# Patient Record
Sex: FEMALE | Race: WHITE | NOT HISPANIC OR LATINO | Employment: FULL TIME | URBAN - METROPOLITAN AREA
[De-identification: names, ages, dates, MRNs, and addresses within clinical notes are randomized per-mention and may not be internally consistent; named-entity substitution may affect disease eponyms.]

---

## 2021-07-27 ENCOUNTER — OFFICE VISIT (OUTPATIENT)
Dept: URGENT CARE | Facility: URGENT CARE | Age: 46
End: 2021-07-27
Payer: COMMERCIAL

## 2021-07-27 ENCOUNTER — ANCILLARY PROCEDURE (OUTPATIENT)
Dept: GENERAL RADIOLOGY | Facility: CLINIC | Age: 46
End: 2021-07-27
Attending: PHYSICIAN ASSISTANT
Payer: COMMERCIAL

## 2021-07-27 VITALS
TEMPERATURE: 97.3 F | OXYGEN SATURATION: 96 % | DIASTOLIC BLOOD PRESSURE: 71 MMHG | HEART RATE: 80 BPM | SYSTOLIC BLOOD PRESSURE: 123 MMHG | WEIGHT: 140.7 LBS

## 2021-07-27 DIAGNOSIS — R05.9 COUGH: ICD-10-CM

## 2021-07-27 DIAGNOSIS — J18.9 PNEUMONIA OF RIGHT MIDDLE LOBE DUE TO INFECTIOUS ORGANISM: Primary | ICD-10-CM

## 2021-07-27 PROCEDURE — 71046 X-RAY EXAM CHEST 2 VIEWS: CPT | Performed by: RADIOLOGY

## 2021-07-27 PROCEDURE — 96372 THER/PROPH/DIAG INJ SC/IM: CPT | Performed by: PHYSICIAN ASSISTANT

## 2021-07-27 PROCEDURE — 99204 OFFICE O/P NEW MOD 45 MIN: CPT | Mod: 25 | Performed by: PHYSICIAN ASSISTANT

## 2021-07-27 RX ORDER — AZITHROMYCIN 250 MG/1
TABLET, FILM COATED ORAL
Qty: 6 TABLET | Refills: 0 | Status: SHIPPED | OUTPATIENT
Start: 2021-07-27

## 2021-07-27 RX ORDER — ALBUTEROL SULFATE 90 UG/1
1-2 AEROSOL, METERED RESPIRATORY (INHALATION)
COMMUNITY
Start: 2021-04-19

## 2021-07-27 RX ORDER — ALPRAZOLAM 0.25 MG
0.25 TABLET ORAL
COMMUNITY
Start: 2021-04-19

## 2021-07-27 RX ORDER — CEFTRIAXONE SODIUM 1 G
1 VIAL (EA) INJECTION ONCE
Status: COMPLETED | OUTPATIENT
Start: 2021-07-27 | End: 2021-07-27

## 2021-07-27 RX ORDER — PREDNISONE 20 MG/1
TABLET ORAL
COMMUNITY
Start: 2021-07-21

## 2021-07-27 RX ADMIN — Medication 1 G: at 18:12

## 2021-07-27 NOTE — PATIENT INSTRUCTIONS
Rest, Hydrate for the next 48 hours    Follow up with Primary Care Provider in 2 days    Follow up right away with worsening of symptoms    Patient Education     Pneumonia (Adult)  Pneumonia is an infection deep in the lungs. It is in the small air sacs (alveoli). It may be caused by a virus, fungus, or bacteria. Pneumonia caused by bacteria is often treated with an antibiotic. Severe cases may need to be treated in the hospital. Milder cases can be treated at home. Pneumonia symptoms are a lot like flu symptoms. They include fever, cough (dry or with phlegm), headache, muscle weakness, and pain. These symptoms often get worse in the first 2 days. But they often start to get better in the first week of treatment.    Home care  Follow these guidelines when caring for yourself at home:    Get plenty of rest. Don t let yourself get overly tired when you go back to your activities. Participate in activities as directed by your healthcare provider.    Stop smoking. This is the most important step you can take to help treat pneumonia. If you need help stopping smoking, talk with your healthcare provider.    Stay away from smoke and other irritants. Stay away from secondhand smoke. Don t let anyone smoke in your home.    Prevent lung infections. Ask your healthcare provider about the flu and pneumonia vaccines. Take steps to prevent colds and other lung infections.    Practice correct handwashing. Wash your hands often with soap and water. Use hand  when you can t wash your hands. Stay away from crowds during cold and flu season.    Use pain medicine as directed. You may use acetaminophen or ibuprofen to control fever or pain, unless another medicine was prescribed. If you have chronic liver or kidney disease, talk with your healthcare provider before using these medicines. Also talk with your provider if you ve had a stomach ulcer or GI (gastrointestinal) bleeding. Don t give aspirin to a child younger than age  19 unless directed by the provider. Taking aspirin can put a child at risk for Reye syndrome. This is a rare but very serious disorder. It most often affects the brain and the liver.    Eat a light diet as needed. You may not feel like eating, so a light diet is fine. Follow the treatment plan as advised by your healthcare provider.    Drink plenty of water and fluids. This can make mucus thinner and easier to cough up. Ask your healthcare provider how much water you should drink. For many people, 6 to 8 glasses (8 ounces each) a day is a good goal. Other fluids include sport drinks, sodas without caffeine, juices, tea, or soup. If you also have heart or kidney disease, check with your provider before you drink extra fluids.    Finish all prescription medicine. Take antibiotic or antiviral medicine as prescribed by your healthcare provider, even if you are feeling better after a few days. Take the medicine until it is all gone.    Try to stay away from air pollution. If you live in an area with air pollution, track the Air Quality Index (AQI) reports and plan your outdoor activities with the AQI recommendations in mind  Follow-up care  Follow up with your healthcare provider in the next 2 to 3 days, or as advised. This is to be sure the medicine is helping you get better.  If you are 65 or older, you should get a pneumococcal vaccine and a yearly flu (influenza) shot. You should also get these vaccines if you have chronic lung disease such as asthma, emphysema, or COPD. A second type of pneumonia vaccine is also available for people over age 65 and those younger than 65 with certain health conditions. Talk with your healthcare provider about which pneumococcal vaccine is best for you.  Call 911  Call 911 if any of these occur:    Unable to speak or swallow    Lips or skin looks blue, purple, or gray    Feeling dizzy or faint    Unable to wake up or loss of consciousness    Feeling of doom    Trouble breathing or  wheezing    Shortness of breath gets worse or doesn't get better with treatment    Rapid breathing (more than 25 breaths per minute)    Coughing up blood    Chest pain gets worse with breathing or doesn't get better with treatment  When to get medical advice  Call your healthcare provider right away if any of these occur:    You don t get better in the first 2 days of treatment    Fever of 100.4 F (38 C) or higher, or as directed by your healthcare provider    Shaking chills    Cough with phlegm that doesn't get better, or get worse    Shortness of breath with activities    Weakness, dizziness, or fainting that gets worse    Thirst or dry mouth that gets worse    Sinus pain, headache, or a stiff neck    Chest pain with breathing or coughing    Symptoms that get worse or not improving  [employer] last reviewed this educational content on 11/1/2019 2000-2021 [employer]. All rights reserved. This information is not intended as a substitute for professional medical care. Always follow your healthcare professional's instructions.             Follow up immediately with severe headache, chest pain, or shortness of breath    Rest, isolate for 10 days, hydrate, follow up if worsening or new symptoms  Household members to isolate until test results, if positive isolate for 2 weeks and follow up for testing if symptoms occur         Patient Education     Coronavirus Disease 2019 (COVID-19): Caring for Yourself or Others   If you or a household member have symptoms of COVID-19, follow the guidelines below. This will help you manage symptoms and keep the virus from spreading.  If you have symptoms of COVID-19    Stay home and contact your care team. They will tell you what to do.    Don t panic. Keep in mind that other illnesses can cause similar symptoms.    Stay away from work, school, and public places.    Limit physical contact with others in your home. Limit visitors. No kissing.  Clean surfaces you touch  with disinfectant.  If you need to cough or sneeze, do it into a tissue. Then throw the tissue into the trash. If you don't have tissues, cough or sneeze into the bend of your elbow.  Don t share food or personal items with people in your household. This includes items like eating and drinking utensils, towels, and bedding.  Wear a cloth face mask around other people. During a public health emergency, medical face masks may be reserved for healthcare workers. You may need to make a cloth face mask of your own. You can do this using a bandana, T-shirt, or other cloth. The [facility] has instructions on how to make a face mask. Wear the mask so that it covers both your nose and mouth.  If you need to go to a hospital or clinic, call ahead to let them know. Expect the care team to wear masks, gowns, gloves, and eye protection. You may need to come to a different entrance or wait in a separate room.  Follow all instructions from your care team.    If you ve been diagnosed with COVID-19    Stay home and away from others, including other people in your home. (This is called self-isolation.) Don t leave home unless you need to get medical care. Don t go to work, school, or public places. Don t use buses, taxis, or other public transportation.    Follow all instructions from your care team.    If you need to go to a hospital or clinic, call ahead to let them know. Expect the care team to wear masks, gowns, gloves, and eye protection. You may need to come to a different entrance or wait in a separate room.    Wear a face mask over your nose and mouth. This is to protect others from your germs. If you can t wear a mask, your caregivers should wear one. You may need to make your own mask using a bandana, T-shirt, or other cloth. See the [facility] s instructions on how to make a face mask.    Have no contact with pets and other animals.    Don t share food or personal items with people in your household. This includes items like eating and  drinking utensils, towels, and bedding.    If you need to cough or sneeze, do it into a tissue. Then throw the tissue into the trash. If you don't have tissues, cough or sneeze into the bend of your elbow.    Wash your hands often.    Self-care at home   At this time, there is no medicine approved to prevent or treat COVID-19. The FDA has approved an antiviral medicine (called remdesivir) for people being treated in the hospital. This is for people 12 years and older who weigh more than about 88 pounds (40 kgs). In certain cases, it may also be used for people younger than 12 years or who weigh less than about 88 pounds (40 kgs)..  Currently, treatment is mostly aimed at helping your body while it fights the virus.    Getting extra rest.    Drink extra fluids 6 to 8 glasses of liquids each day), unless a doctor has told you not to. Ask your care team which fluids are best for you. Avoid fluids that contain caffeine or alcohol.    Taking over-the-counter (OTC) medicine to reduce pain and fever. Your care team will tell you which medicine to use.  If you ve been in the hospital for COVID-19, follow your care team s instructions. They will tell you when to stop self-isolation. They may also have you change positions to help your breathing (such as lying on your belly).  If a test showed that you have COVID-19, you may be asked to donate plasma after you ve recovered. (This is called COVID-19 convalescent plasma donation.) The plasma may contain antibodies to help fight the virus in others. Experts don't know if the plasma will work well as a treatment. Research continues, and the FDA has approved it for emergency use in certain people with serious or life-threatening COVID-19. For more information, talk to your care team.  Caring for a sick person     Follow all instructions from the care team.    Wash your hands often.    Wear protective clothing as advised.    Make sure the sick person wears a mask. If they can't  wear a mask, don t stay in the same room with them. If you must be in the same room, wear a face mask. Make sure the mask covers both the nose and mouth.    Keep track of the sick person s symptoms.    Clean surfaces often with disinfectant. This includes phones, kitchen counters, fridge door handles, bathroom surfaces, and others.    Don t let anyone share household items with the sick person. This includes eating and drinking tools, towels, sheets, or blankets.    Clean fabrics and laundry well.    Keep other people and pets away from the sick person.    When you can stop self-isolation  When you are sick with COVID-19, you should stay away from other people. This is called self-isolation. The rules for ending self-isolation depend on your health in general.  If you are normally healthy:  You can stop self-isolation when all 3 of these are true:    You ve had no fever--and no medicine that reduces fever--for at least 24 hours. And     Your symptoms are better, such as cough or trouble breathing. And     At least 10 days have passed since your symptoms first started.  Talk with your care team before you leave home. They may tell you it s okay to leave, or they may give you different advice. You do not need to be re-tested.  If you have a weak immune system, or you ve had severe COVID-19:  Follow your care team s instructions. You may be asked to self-isolate for 10 days to 20 days after your symptoms first started. Your care team may want to re-test you for COVID-19.  Note: If you re being treated for cancer, have an immune disorder (such as HIV), or have had a transplant (organ or bone marrow), you may have a weak immune system.  If you've already had COVID-19 once:  If you had the virus over 3 months ago, and you ve been exposed again, treat it like you've never had COVID-19. Stay home, limit your contact with others, call your care team, and watch for symptoms.  If it s been less than 3 months since you had the  virus, you re symptom-free, and you ve been exposed again: You don t need to self-isolate or be re-tested. But if you develop new symptoms that can t be linked to another illness, please self-isolate and contact your care team.  When you return to public settings  When you are well enough to go outside your home, follow the CDC s guidance on cloth face masks.    Anyone over age 2 should wear a face mask in public, especially when it's hard to socially distance. This includes public transit, public protests and marches, and crowded stores, bars, and restaurants.    Face masks are most likely to reduce the spread of COVID-19 when they are widely used by people who are out in the public.  Certain people should not wear a face covering. These include:    Children under 2 years old    Anyone with a health, developmental, or mental health condition that can be made worse by wearing a mask    Anyone who is unconscious or unable to remove the face covering without help. See the CDC's guidance on who should not wear a face mask.  When to call your care team  Call your care team right away if a sick person has any of these:    Trouble breathing    Pain or pressure in chest  If a sick person has any of these, call 911:    Trouble breathing that gets worse    Pain or pressure in chest that gets worse    Blue tint to lips or face    Fast or irregular heartbeat    Confusion or trouble waking    Fainting or loss of consciousness    Coughing up blood  Going home from the hospital   If you have COVID-19 and were recently in the hospital:    Follow the instructions above for self-care and isolation.    Follow the hospital care team s instructions.    Ask questions if anything is unclear to you. Write down answers so you remember them.  Date last modified: 11/23/2020  Javier last reviewed this educational content on 4/1/2020  This information has been modified by your health care provider with permission from the publisher.     4543-6986 [employer]. [address] [place] [address], [place] [address]. All rights reserved. This information is not intended as a substitute for professional medical care. Always follow your healthcare professional's instructions.

## 2021-07-27 NOTE — PROGRESS NOTES
SUBJECTIVE:    Serina Block is a 46 year old female here with concerns about sinus infection.  She states onset of symptoms were 10 day(s) ago.  Drainage, cough, stuffiness.    No chest pain.      COVID19 VACCINATED      No past medical history on file.  Social History     Tobacco Use     Smoking status: Never Smoker   Substance Use Topics     Alcohol use: Not on file       ROS:  10 point ROS negative except as listed above      OBJECTIVE:  /71   Pulse 80   Temp 97.3  F (36.3  C)   Wt 63.8 kg (140 lb 11.2 oz)   SpO2 96%   Exam:GENERAL APPEARANCE: healthy, alert and no distress  EYES: EOMI,  PERRL, conjunctiva clear  HENT: ear canals and TM's normal.  Nose and mouth without ulcers, erythema or lesions  NECK: supple, nontender, no lymphadenopathy  RESP: crackles at RML and RLL,  CV: regular rates and rhythm, normal S1 S2, no murmur noted  NEURO: Normal strength and tone, sensory exam grossly normal,  normal speech and mentation  SKIN: no suspicious lesions or rashes    X-ray image initially interpreted in clinic by me indicating pneumonia.      Results for orders placed or performed in visit on 07/27/21   XR Chest 2 Views     Status: None    Narrative    CHEST TWO VIEW   7/27/2021 5:44 PM     HISTORY: Cough.    COMPARISON: None available.      Impression    IMPRESSION: PA and lateral views of the chest were obtained.  Cardiomediastinal silhouette is within normal limits. Medial right  basilar pulmonary opacities, which is silhouetting the right heart  border, worrisome for infection. No significant pleural effusion or  pneumothorax.    RENATA LIGHT MD         SYSTEM ID:  [id number]       ASSESSMENT:  (J18.9) Pneumonia of right middle lobe due to infectious organism  (primary encounter diagnosis)  Plan: cefTRIAXone (ROCEPHIN) injection 1 g, INJECTION        INTRAMUSCULAR OR SUB-Q, azithromycin         (ZITHROMAX) 250 MG tablet,         amoxicillin-clavulanate (AUGMENTIN) 875-125 MG          tablet      (R05) Cough  Plan: XR Chest 2 Views       Red flags and emergent follow up discussed, and understood by patient  Follow up with PCP if symptoms worsen or fail to improve      Patient Instructions   Rest, Hydrate for the next 48 hours    Follow up with Primary Care Provider in 2 days    Follow up right away with worsening of symptoms    Patient Education     Pneumonia (Adult)  Pneumonia is an infection deep in the lungs. It is in the small air sacs (alveoli). It may be caused by a virus, fungus, or bacteria. Pneumonia caused by bacteria is often treated with an antibiotic. Severe cases may need to be treated in the hospital. Milder cases can be treated at home. Pneumonia symptoms are a lot like flu symptoms. They include fever, cough (dry or with phlegm), headache, muscle weakness, and pain. These symptoms often get worse in the first 2 days. But they often start to get better in the first week of treatment.    Home care  Follow these guidelines when caring for yourself at home:    Get plenty of rest. Don t let yourself get overly tired when you go back to your activities. Participate in activities as directed by your healthcare provider.    Stop smoking. This is the most important step you can take to help treat pneumonia. If you need help stopping smoking, talk with your healthcare provider.    Stay away from smoke and other irritants. Stay away from secondhand smoke. Don t let anyone smoke in your home.    Prevent lung infections. Ask your healthcare provider about the flu and pneumonia vaccines. Take steps to prevent colds and other lung infections.    Practice correct handwashing. Wash your hands often with soap and water. Use hand  when you can t wash your hands. Stay away from crowds during cold and flu season.    Use pain medicine as directed. You may use acetaminophen or ibuprofen to control fever or pain, unless another medicine was prescribed. If you have chronic liver or kidney  disease, talk with your healthcare provider before using these medicines. Also talk with your provider if you ve had a stomach ulcer or GI (gastrointestinal) bleeding. Don t give aspirin to a child younger than age 19 unless directed by the provider. Taking aspirin can put a child at risk for Reye syndrome. This is a rare but very serious disorder. It most often affects the brain and the liver.    Eat a light diet as needed. You may not feel like eating, so a light diet is fine. Follow the treatment plan as advised by your healthcare provider.    Drink plenty of water and fluids. This can make mucus thinner and easier to cough up. Ask your healthcare provider how much water you should drink. For many people, 6 to 8 glasses (8 ounces each) a day is a good goal. Other fluids include sport drinks, sodas without caffeine, juices, tea, or soup. If you also have heart or kidney disease, check with your provider before you drink extra fluids.    Finish all prescription medicine. Take antibiotic or antiviral medicine as prescribed by your healthcare provider, even if you are feeling better after a few days. Take the medicine until it is all gone.    Try to stay away from air pollution. If you live in an area with air pollution, track the Air Quality Index (AQI) reports and plan your outdoor activities with the AQI recommendations in mind  Follow-up care  Follow up with your healthcare provider in the next 2 to 3 days, or as advised. This is to be sure the medicine is helping you get better.  If you are 65 or older, you should get a pneumococcal vaccine and a yearly flu (influenza) shot. You should also get these vaccines if you have chronic lung disease such as asthma, emphysema, or COPD. A second type of pneumonia vaccine is also available for people over age 65 and those younger than 65 with certain health conditions. Talk with your healthcare provider about which pneumococcal vaccine is best for you.  Call 911  Call 911  if any of these occur:    Unable to speak or swallow    Lips or skin looks blue, purple, or gray    Feeling dizzy or faint    Unable to wake up or loss of consciousness    Feeling of doom    Trouble breathing or wheezing    Shortness of breath gets worse or doesn't get better with treatment    Rapid breathing (more than 25 breaths per minute)    Coughing up blood    Chest pain gets worse with breathing or doesn't get better with treatment  When to get medical advice  Call your healthcare provider right away if any of these occur:    You don t get better in the first 2 days of treatment    Fever of 100.4 F (38 C) or higher, or as directed by your healthcare provider    Shaking chills    Cough with phlegm that doesn't get better, or get worse    Shortness of breath with activities    Weakness, dizziness, or fainting that gets worse    Thirst or dry mouth that gets worse    Sinus pain, headache, or a stiff neck    Chest pain with breathing or coughing    Symptoms that get worse or not improving  [employer] last reviewed this educational content on 11/1/2019 2000-2021 [employer]. All rights reserved. This information is not intended as a substitute for professional medical care. Always follow your healthcare professional's instructions.             Follow up immediately with severe headache, chest pain, or shortness of breath    Rest, isolate for 10 days, hydrate, follow up if worsening or new symptoms  Household members to isolate until test results, if positive isolate for 2 weeks and follow up for testing if symptoms occur         Patient Education     Coronavirus Disease 2019 (COVID-19): Caring for Yourself or Others   If you or a household member have symptoms of COVID-19, follow the guidelines below. This will help you manage symptoms and keep the virus from spreading.  If you have symptoms of COVID-19    Stay home and contact your care team. They will tell you what to do.    Don t panic. Keep in  mind that other illnesses can cause similar symptoms.    Stay away from work, school, and public places.    Limit physical contact with others in your home. Limit visitors. No kissing.  Clean surfaces you touch with disinfectant.  If you need to cough or sneeze, do it into a tissue. Then throw the tissue into the trash. If you don't have tissues, cough or sneeze into the bend of your elbow.  Don t share food or personal items with people in your household. This includes items like eating and drinking utensils, towels, and bedding.  Wear a cloth face mask around other people. During a public health emergency, medical face masks may be reserved for healthcare workers. You may need to make a cloth face mask of your own. You can do this using a bandana, T-shirt, or other cloth. The [facility] has instructions on how to make a face mask. Wear the mask so that it covers both your nose and mouth.  If you need to go to a hospital or clinic, call ahead to let them know. Expect the care team to wear masks, gowns, gloves, and eye protection. You may need to come to a different entrance or wait in a separate room.  Follow all instructions from your care team.    If you ve been diagnosed with COVID-19    Stay home and away from others, including other people in your home. (This is called self-isolation.) Don t leave home unless you need to get medical care. Don t go to work, school, or public places. Don t use buses, taxis, or other public transportation.    Follow all instructions from your care team.    If you need to go to a hospital or clinic, call ahead to let them know. Expect the care team to wear masks, gowns, gloves, and eye protection. You may need to come to a different entrance or wait in a separate room.    Wear a face mask over your nose and mouth. This is to protect others from your germs. If you can t wear a mask, your caregivers should wear one. You may need to make your own mask using a bandana, T-shirt, or other  cloth. See the CDC s instructions on how to make a face mask.    Have no contact with pets and other animals.    Don t share food or personal items with people in your household. This includes items like eating and drinking utensils, towels, and bedding.    If you need to cough or sneeze, do it into a tissue. Then throw the tissue into the trash. If you don't have tissues, cough or sneeze into the bend of your elbow.    Wash your hands often.    Self-care at home   At this time, there is no medicine approved to prevent or treat COVID-19. The FDA has approved an antiviral medicine (called remdesivir) for people being treated in the hospital. This is for people 12 years and older who weigh more than about 88 pounds (40 kgs). In certain cases, it may also be used for people younger than 12 years or who weigh less than about 88 pounds (40 kgs)..  Currently, treatment is mostly aimed at helping your body while it fights the virus.    Getting extra rest.    Drink extra fluids 6 to 8 glasses of liquids each day), unless a doctor has told you not to. Ask your care team which fluids are best for you. Avoid fluids that contain caffeine or alcohol.    Taking over-the-counter (OTC) medicine to reduce pain and fever. Your care team will tell you which medicine to use.  If you ve been in the hospital for COVID-19, follow your care team s instructions. They will tell you when to stop self-isolation. They may also have you change positions to help your breathing (such as lying on your belly).  If a test showed that you have COVID-19, you may be asked to donate plasma after you ve recovered. (This is called COVID-19 convalescent plasma donation.) The plasma may contain antibodies to help fight the virus in others. Experts don't know if the plasma will work well as a treatment. Research continues, and the FDA has approved it for emergency use in certain people with serious or life-threatening COVID-19. For more information, talk to  your care team.  Caring for a sick person     Follow all instructions from the care team.    Wash your hands often.    Wear protective clothing as advised.    Make sure the sick person wears a mask. If they can't wear a mask, don t stay in the same room with them. If you must be in the same room, wear a face mask. Make sure the mask covers both the nose and mouth.    Keep track of the sick person s symptoms.    Clean surfaces often with disinfectant. This includes phones, kitchen counters, fridge door handles, bathroom surfaces, and others.    Don t let anyone share household items with the sick person. This includes eating and drinking tools, towels, sheets, or blankets.    Clean fabrics and laundry well.    Keep other people and pets away from the sick person.    When you can stop self-isolation  When you are sick with COVID-19, you should stay away from other people. This is called self-isolation. The rules for ending self-isolation depend on your health in general.  If you are normally healthy:  You can stop self-isolation when all 3 of these are true:    You ve had no fever--and no medicine that reduces fever--for at least 24 hours. And     Your symptoms are better, such as cough or trouble breathing. And     At least 10 days have passed since your symptoms first started.  Talk with your care team before you leave home. They may tell you it s okay to leave, or they may give you different advice. You do not need to be re-tested.  If you have a weak immune system, or you ve had severe COVID-19:  Follow your care team s instructions. You may be asked to self-isolate for 10 days to 20 days after your symptoms first started. Your care team may want to re-test you for COVID-19.  Note: If you re being treated for cancer, have an immune disorder (such as HIV), or have had a transplant (organ or bone marrow), you may have a weak immune system.  If you've already had COVID-19 once:  If you had the virus over 3 months  ago, and you ve been exposed again, treat it like you've never had COVID-19. Stay home, limit your contact with others, call your care team, and watch for symptoms.  If it s been less than 3 months since you had the virus, you re symptom-free, and you ve been exposed again: You don t need to self-isolate or be re-tested. But if you develop new symptoms that can t be linked to another illness, please self-isolate and contact your care team.  When you return to public settings  When you are well enough to go outside your home, follow the CDC s guidance on cloth face masks.    Anyone over age 2 should wear a face mask in public, especially when it's hard to socially distance. This includes public transit, public protests and marches, and crowded stores, bars, and restaurants.    Face masks are most likely to reduce the spread of COVID-19 when they are widely used by people who are out in the public.  Certain people should not wear a face covering. These include:    Children under 2 years old    Anyone with a health, developmental, or mental health condition that can be made worse by wearing a mask    Anyone who is unconscious or unable to remove the face covering without help. See the CDC's guidance on who should not wear a face mask.  When to call your care team  Call your care team right away if a sick person has any of these:    Trouble breathing    Pain or pressure in chest  If a sick person has any of these, call 911:    Trouble breathing that gets worse    Pain or pressure in chest that gets worse    Blue tint to lips or face    Fast or irregular heartbeat    Confusion or trouble waking    Fainting or loss of consciousness    Coughing up blood  Going home from the hospital   If you have COVID-19 and were recently in the hospital:    Follow the instructions above for self-care and isolation.    Follow the hospital care team s instructions.    Ask questions if anything is unclear to you. Write down answers so you  remember them.  Date last modified: 11/23/2020  Javier last reviewed this educational content on 4/1/2020  This information has been modified by your health care provider with permission from the publisher.    3980-3411 [employer]. [address] [place] [address], [place] [address]. All rights reserved. This information is not intended as a substitute for professional medical care. Always follow your healthcare professional's instructions.

## 2023-01-06 ENCOUNTER — TELEPHONE (OUTPATIENT)
Dept: FAMILY MEDICINE | Facility: CLINIC | Age: 48
End: 2023-01-06

## 2023-01-06 NOTE — TELEPHONE ENCOUNTER
[facility] phone call message- general phone call:    Reason for call: the Pt is going to [facility] on 2/4/2023 and has an appointment on 1/16/2023    Return call needed: Yes    OK to leave a message on voice mail? Yes    Primary language: English      needed? No    Call taken on January 6, 2023 at 10:10 AM by Jaiden Nguyen

## 2023-01-16 ENCOUNTER — OFFICE VISIT (OUTPATIENT)
Dept: FAMILY MEDICINE | Facility: CLINIC | Age: 48
End: 2023-01-16
Payer: COMMERCIAL

## 2023-01-16 VITALS
DIASTOLIC BLOOD PRESSURE: 68 MMHG | RESPIRATION RATE: 28 BRPM | SYSTOLIC BLOOD PRESSURE: 107 MMHG | OXYGEN SATURATION: 99 % | TEMPERATURE: 98.6 F | WEIGHT: 145.8 LBS | HEART RATE: 64 BPM

## 2023-01-16 DIAGNOSIS — Z71.84 TRAVEL ADVICE ENCOUNTER: Primary | ICD-10-CM

## 2023-01-16 DIAGNOSIS — A09 TRAVELER'S DIARRHEA: ICD-10-CM

## 2023-01-16 PROBLEM — J45.20 MILD INTERMITTENT ASTHMA WITHOUT COMPLICATION: Status: ACTIVE | Noted: 2022-02-23

## 2023-01-16 PROCEDURE — 90472 IMMUNIZATION ADMIN EACH ADD: CPT | Performed by: STUDENT IN AN ORGANIZED HEALTH CARE EDUCATION/TRAINING PROGRAM

## 2023-01-16 PROCEDURE — 99214 OFFICE O/P EST MOD 30 MIN: CPT | Mod: 25 | Performed by: STUDENT IN AN ORGANIZED HEALTH CARE EDUCATION/TRAINING PROGRAM

## 2023-01-16 PROCEDURE — 90632 HEPA VACCINE ADULT IM: CPT | Performed by: STUDENT IN AN ORGANIZED HEALTH CARE EDUCATION/TRAINING PROGRAM

## 2023-01-16 PROCEDURE — 90471 IMMUNIZATION ADMIN: CPT | Performed by: STUDENT IN AN ORGANIZED HEALTH CARE EDUCATION/TRAINING PROGRAM

## 2023-01-16 PROCEDURE — 90717 YELLOW FEVER VACCINE SUBQ: CPT | Performed by: STUDENT IN AN ORGANIZED HEALTH CARE EDUCATION/TRAINING PROGRAM

## 2023-01-16 RX ORDER — AZITHROMYCIN 250 MG/1
500 TABLET, FILM COATED ORAL DAILY
Qty: 6 TABLET | Refills: 0 | Status: SHIPPED | OUTPATIENT
Start: 2023-01-16 | End: 2023-02-01

## 2023-01-16 NOTE — PROGRESS NOTES
Preceptor Attestation:    I discussed the patient with the resident and evaluated the patient in person. I have verified the content of the note, which accurately reflects my assessment of the patient and the plan of care.   Supervising Physician:  Thomas Loredo MD.

## 2023-01-16 NOTE — PROGRESS NOTES
[facility] Travel Visit       Serina Block is a 47 year old female who presents for a pre-travel assessment visit.  She will be traveling to:    Destination(s):  [facility] for 1 week 02/04/23-02/11/23    Reason for travel: fun- [place]. 4 day [place] backpacking trek    Serina Block has completed the travel questionnaire and it is reviewed: YES  Are there special circumstances which place this traveler at higher risk (List if 'yes')?: NO     (For women only)  Is patient currently pregnant or might become pregnant within three months of this trip? NO   Is she breastfeeding? NO     Patient Active Problem List   Diagnosis     Mild intermittent asthma without complication     Pectus excavatum   Anxiety and depression     No Known Allergies   Did get swelling of her foot after being stung by a bee. Has been stung since without significant swelling    Social history: drinks alcohol, no tobacco or other drug use    Travelling with: brother    Immunizations, travel specific:  -- Yellow fever: Recommended, patient accepts  -- Hep A: Recommended, patient accepts  -- Hep B: Immunity status unknown  -- Typhoid (IM: booster every 2 years; PO: booster every 5 years): Immunity status unknown  -- Rabies  (Data on the need for and timing of additional booster doses are not available): Immunity status unknown  -- Meningococcal meningitis Immunity status unknown   -- Kinyarwanda encephalitis (Data on the need for and timing of additional booster doses are not available):Immunity status unknown    Immunizations, routine adult:  -- Influenza Immunity status unknown  -- Polio: Immunity status unknown; trip duration under 4 weeks so IPV booster is not needed  -- Diphtheria, Tetanus & Pertussis (DTaP): UTD with immunization, though booster would be recommended for travel. Patient declines  -- Measles/ Mumps/ Rubella: Immunity status unknown  -- Varicella: Immunity status unknown  -- Pneumococcal (PPSV23  (Pneumovax)): Immunity status unknown  -- Pneumococcal (PCV13 (Prevnar)): Immunity status unknown  -- COVID-19: Has received 1 J&J vaccine    Malaria prophylaxis:  Recommended (refer to [facility] for destination-specific recommendation) Not strongly recommended due to areas patient will be staying, but patient is at higher risk due to a 4 day backpacking trip she will be taking       Review of Systems:       CONSTITUTIONAL: NEGATIVE for fever, chills, change in weight  ENT/MOUTH: NEGATIVE for ear, mouth and throat problems  RESP: NEGATIVE for significant cough or SOB  CV: NEGATIVE for chest pain, palpitations or peripheral edema          Objective:     /68   Pulse 64   Temp 98.6  F (37  C) (Oral)   Resp 28   Wt 66.1 kg (145 lb 12.8 oz)   LMP 12/29/2022   SpO2 99%   There is no height or weight on file to calculate BMI.    General: alert, appears comfortable, no acute distress  HEENT: atraumatic, conjunctiva clear without erythema, EOM's intact, mask in place  Neck: supple  Cardiac: normal rate, appears well perfused  Resp: breathing comfortably on room air, no increased work of breathing  Skin: no rashes or suspicious legions on exposed skin  Neuro: CN's grossly intact  Psych: affect congruent with mood           Assessment and Plan     Based on patient's past history, review of immunization and immunity status, planned itinerary and current recommendations, the following are recommended:    Yellow fever vaccine- patient accepted  Hep A vaccine- patient accepted  Typhoid vaccine - patient declined  TDAP vaccine- received 8 years ago, recommended booster, patient declined  Malaria: Offered, pt declines  Traveler's diarrhea treatment prescribed.    Patient is given a copy of the [employer] traveller report as well as destination-specific recommendations on sun protection, avoiding insect bites and travel safety.      Total of 20 minutes was spent in face to face contact with patient with > 50% in counseling and  coordination of care.  Total encounter including preparation, review of travel guidelines, placing orders and completion of documentation: 32 minutes.  Options for treatment and/or follow-up care were reviewed with the patient. Serina Block was engaged and actively involved in the decision making process. She verbalized understanding of the options discussed and was satisfied with the final plan.      Alysha Downing MD

## 2023-01-31 ENCOUNTER — TELEPHONE (OUTPATIENT)
Dept: FAMILY MEDICINE | Facility: CLINIC | Age: 48
End: 2023-01-31

## 2023-01-31 NOTE — TELEPHONE ENCOUNTER
Patient call stating that she has not receive her medication that was discuss at her travel visit appointment 1/16/23. Please advise. Terri Groves CMA

## 2023-02-01 RX ORDER — AZITHROMYCIN 250 MG/1
500 TABLET, FILM COATED ORAL DAILY
Qty: 6 TABLET | Refills: 0 | Status: SHIPPED | OUTPATIENT
Start: 2023-02-01

## 2023-02-01 NOTE — TELEPHONE ENCOUNTER
Please call patient back. The azithromycin was sent to [facility] on [address]. I resent the prescription today. She should call the pharmacy later today if she has not been told it is ready.     Alysha Downing MD, PGY-3  [facility]  February 1, 2023

## 2023-02-01 NOTE — TELEPHONE ENCOUNTER
Pt called back and I was unable to reach Jenelle so I read the message from from Dr Downing. She will call her pharmacy.

## 2023-02-01 NOTE — TELEPHONE ENCOUNTER
Called Pt's number twice at 9 am and was not able to leave a voice mail. Called pt's alternate contact to have her call us back.  KANA Her MA

## 2023-04-16 ENCOUNTER — HEALTH MAINTENANCE LETTER (OUTPATIENT)
Age: 48
End: 2023-04-16

## 2023-08-31 ENCOUNTER — OFFICE VISIT (OUTPATIENT)
Dept: URGENT CARE | Facility: URGENT CARE | Age: 48
End: 2023-08-31
Payer: COMMERCIAL

## 2023-08-31 VITALS
OXYGEN SATURATION: 96 % | DIASTOLIC BLOOD PRESSURE: 78 MMHG | SYSTOLIC BLOOD PRESSURE: 124 MMHG | HEART RATE: 69 BPM | WEIGHT: 150 LBS | TEMPERATURE: 98.6 F

## 2023-08-31 DIAGNOSIS — S61.216A LACERATION OF RIGHT LITTLE FINGER WITHOUT FOREIGN BODY WITHOUT DAMAGE TO NAIL, INITIAL ENCOUNTER: Primary | ICD-10-CM

## 2023-08-31 PROCEDURE — 99213 OFFICE O/P EST LOW 20 MIN: CPT | Performed by: FAMILY MEDICINE

## 2023-08-31 RX ORDER — CEPHALEXIN 500 MG/1
500 CAPSULE ORAL 3 TIMES DAILY
Qty: 21 CAPSULE | Refills: 0 | Status: SHIPPED | OUTPATIENT
Start: 2023-08-31 | End: 2023-09-07

## 2023-08-31 NOTE — PATIENT INSTRUCTIONS
Allow steri-strips to peel off on its own  Take full course of antibiotic to prevent wound infection    Use finger splint to not bend finger until wound heals  Okay for tylenol and ibuprofen for discomfort

## 2023-08-31 NOTE — PROGRESS NOTES
SUBJECTIVE:  Chief Complaint   Patient presents with    Urgent Care     Pt states that she cut her right pinky finger last night on glass.     Serina lBock is a 48 year old female presents with a chief complaint of laceration on right 5th finger.    Was washing glass and accidentally broke, sustained wound to finger.  This was yesterday around 8pm.  Did clean and put bandaid.  Went to work today    Patient is right handed    Tdap 2015    No past medical history on file.  Current Outpatient Medications   Medication Sig Dispense Refill    albuterol (PROAIR HFA/PROVENTIL HFA/VENTOLIN HFA) 108 (90 Base) MCG/ACT inhaler Inhale 1-2 puffs into the lungs      ALPRAZolam (XANAX) 0.25 MG tablet Take 0.25 mg by mouth      Ibuprofen (ADVIL PO) PRN      azithromycin (ZITHROMAX) 250 MG tablet Take 2 tablets (500 mg) by mouth daily Take after the third loose stool (Patient not taking: Reported on 8/31/2023) 6 tablet 0    azithromycin (ZITHROMAX) 250 MG tablet Two tablets first day, then one tablet daily for four days. (Patient not taking: Reported on 8/31/2023) 6 tablet 0    predniSONE (DELTASONE) 20 MG tablet  (Patient not taking: Reported on 7/27/2021)       Social History     Tobacco Use    Smoking status: Never    Smokeless tobacco: Never   Substance Use Topics    Alcohol use: Not on file       ROS:  Review of systems negative except as stated above.    EXAM:   /78 (BP Location: Left arm, Patient Position: Sitting, Cuff Size: Adult Regular)   Pulse 69   Temp 98.6  F (37  C) (Oral)   Wt 68 kg (150 lb)   SpO2 96%   Gen: healthy,alert,no distress  Extremity: right hand - 5th finger has flap laceration on lateral side middle phalanx.  ROM intact.   There is not compromise to the distal circulation.  Pulses are +2 and CRT is brisk  PSYCH:alert, affect bright    ASSESSMENT/PLAN:  (B67.545S) Laceration of right little finger without foreign body without damage to nail, initial encounter  (primary encounter  diagnosis)  Comment: old laceration  Plan: cephALEXin (KEFLEX) 500 MG capsule            Reviewed that laceration that are over 12 hours old usually not repaired.  Wound cleansed with sterile water and shur-clens.  Flap tissue was lifted and re-approximated.  Steri-strips X8 applied in shane-cross.  Dressing to wound.      RX keflex given to prevent wound infection  Finger splint given to use  Allow steri-strips to peel off on its own.    Carlos Slade MD  August 31, 2023 4:46 PM

## 2024-04-14 ENCOUNTER — HEALTH MAINTENANCE LETTER (OUTPATIENT)
Age: 49
End: 2024-04-14

## 2024-12-02 ENCOUNTER — MEDICAL CORRESPONDENCE (OUTPATIENT)
Dept: HEALTH INFORMATION MANAGEMENT | Facility: CLINIC | Age: 49
End: 2024-12-02
Payer: COMMERCIAL

## 2024-12-02 ENCOUNTER — TRANSCRIBE ORDERS (OUTPATIENT)
Dept: OTHER | Age: 49
End: 2024-12-02

## 2024-12-02 DIAGNOSIS — E78.00 HYPERCHOLESTEROLEMIA: Primary | ICD-10-CM

## 2024-12-03 NOTE — PROGRESS NOTES
HEART CARE OUT-PATIENT CONSULTATON NOTE      [facility]  [phone]      Assessment/Recommendations   Assessment: 49 year old female with hypertriglyceridemia     Plan:  Hypertriglyceridemia  Reviewed diagnosis, potential causes (elevated glucose, diet, EtOH), and the risk of pancreatitis       -Lifestyle modification with exercise, weight loss, avoid carbs and EtOH      -Stop Crestor 10mg, her LDL = 65 so does not need statin      -Vascepa 2g BID, if not covered can use Tricor       -Recheck lipids 2 weeks to ensure trig level coming down       -Referral to Dr Med Park, lipid clinic at the [facility]      -Advised cholesterol screening for siblings and children     2.  Abdominal pain  Could be GERD but wit her markedly elevated trig also worry about pancreatitis      -Check lipase and amylase     The longitudinal plan of care for the diagnosis(es)/condition(s) as documented were addressed during this visit. Due to the added complexity in care, I will continue to support Serina in the subsequent management and with ongoing continuity of care.            History of Present Illness/Subjective    Indication for Consult:  I was asked to see Serina Block by Bing Perez for hypertriglyceridemia       HPI: Serina Block is a 49 year old female with a history of hyperlipidemia who presents for evaluation of hypertriglyceridemia, trig 1490, 2479.    She reports no prior lipid checks, notes frequent abdominal pain, central, lasts an hour or two, can be triggered by fatty foods..  Admits not very active, but no exertional chest pain, dyspnea, orthopnea, or PND.  Doesn't eat red meat.  For breakfast has banana, yogurt, lunch, spaghetti, chicken, rice.       I reviewed notes from PCP prior to this visit.         Physical Examination  Past Cardiac History   Vitals: /68 (BP Location: Right arm, Patient Position: Sitting, Cuff Size: Adult Regular)   Pulse 77   Resp 16   Ht  "1.702 m (5' 7\")   Wt 65.8 kg (145 lb)   SpO2 97%   BMI 22.71 kg/m    BMI= Body mass index is 22.71 kg/m .  Wt Readings from Last 3 Encounters:   12/05/24 65.8 kg (145 lb)   08/31/23 68 kg (150 lb)   01/16/23 66.1 kg (145 lb 12.8 oz)       General Appearance:   no distress, normal body habitus   ENT/Mouth: membranes moist, no oral lesions or bleeding gums.      EYES:  no scleral icterus, normal conjunctivae   Neck: no carotid bruits or thyromegaly   Chest/Lungs:   lungs are clear to auscultation, no rales or wheezing,  sternal scar, equal chest wall expansion    Cardiovascular:   Regular. Normal first and second heart sounds with no murmurs, rubs, or gallops; the carotid, radial and posterior tibial pulses are intact, Jugular venous pressure normal , no edema bilaterally    Abdomen:  no organomegaly, masses, bruits, or tenderness; bowel sounds are present   Extremities: no cyanosis or clubbing   Skin: no xanthelasma, warm.    Neurologic: normal  bilateral, no tremors           None    Most Recent Echocardiogram: 10/8/2018 ([facility])  Normal resting left ventricular function (EF 60%) with normal expected increase with   exercise (EF 70%).  No inducible wall motion    abnormalities.     Most Recent Stress Test: 0/8/2018 ([facility])  1. Normal stress echocardiogram without evidence of stress-induced ischemia.    2. No chest pain or EKG abnormalities with exercise.    3. Normal resting left ventricular function (EF 60%) with normal expected increase with   exercise (EF 70%).  No inducible wall motion    abnormalities.    4. Good exercise tolerance.  Normal hemodynamic response to exercise.     Most Recent Angiogram: None           Medical History  Family History Social History   Hyperlipidemia   Parents have hyperlipidemia on statin      Social History     Socioeconomic History    Marital status: Single     Spouse name: Not on file    Number of children: Not on file    Years of education: Not on file    Highest " education level: Not on file   Occupational History    Not on file   Tobacco Use    Smoking status: Never    Smokeless tobacco: Never   Vaping Use    Vaping status: Never Used   Substance and Sexual Activity    Alcohol use: Not on file    Drug use: Not on file    Sexual activity: Not on file   Other Topics Concern    Not on file   Social History Narrative    Not on file     Social Drivers of Health     Financial Resource Strain: Low Risk  (2/27/2023)    [facility]    Financial Resource Strain     Difficulty of Paying Living Expenses: 3     Difficulty of Paying Living Expenses: Not on file   Food Insecurity: Not on File (9/26/2024)    Received from [email or web address]    Food Insecurity     Food: 0   Transportation Needs: No Transportation Needs (2/27/2023)    [facility] from [employer] [facility]    Transportation Needs     Lack of Transportation (Medical): 1   Physical Activity: Not on File (12/4/2021)    Received from [email or web address]    Physical Activity     Physical Activity: 0   Stress: Not on File (12/4/2021)    Received from [email or web address]    Stress     Stress: 0   Social Connections: Not on File (9/12/2024)    Received from [email or web address]    Social Connections     Connectedness: 0   Interpersonal Safety: Not on file   Housing Stability: Low Risk  (2/27/2023)    Received from [facility], [employer] [facility]    Housing Stability     Unable to Pay for Housing in the Last Year: 1           Medications  Allergies   Current Outpatient Medications   Medication Sig Dispense Refill    rosuvastatin (CRESTOR) 10 MG tablet Take 5 mg (1/2 tab) for two weeks then increase to 10 mg (1 tab) for two weeks and then increase to 20 mg (2 tabs) daily going forward      albuterol (PROAIR HFA/PROVENTIL HFA/VENTOLIN HFA) 108 (90 Base) MCG/ACT inhaler Inhale  "1-2 puffs into the lungs (Patient not taking: Reported on 12/5/2024)      ALPRAZolam (XANAX) 0.25 MG tablet Take 0.25 mg by mouth (Patient not taking: Reported on 12/5/2024)      azithromycin (ZITHROMAX) 250 MG tablet Take 2 tablets (500 mg) by mouth daily Take after the third loose stool (Patient not taking: Reported on 12/5/2024) 6 tablet 0    azithromycin (ZITHROMAX) 250 MG tablet Two tablets first day, then one tablet daily for four days. (Patient not taking: Reported on 12/5/2024) 6 tablet 0    Ibuprofen (ADVIL PO) PRN (Patient not taking: Reported on 12/5/2024)      predniSONE (DELTASONE) 20 MG tablet  (Patient not taking: Reported on 12/5/2024)       No Known Allergies       Lab Results    Chemistry/lipid CBC Cardiac Enzymes/BNP/TSH/INR   No results for input(s): \"CHOL\", \"HDL\", \"LDL\", \"TRIG\", \"CHOLHDLRATIO\" in the last 57567 hours.  No results for input(s): \"LDL\" in the last 05639 hours.  No results for input(s): \"NA\", \"POTASSIUM\", \"CHLORIDE\", \"CO2\", \"GLC\", \"BUN\", \"CR\", \"GFRESTIMATED\", \"ALEJANDRA\" in the last 13431 hours.    Invalid input(s): \"GRFESTBLACK\"  No results for input(s): \"CR\" in the last 16303 hours.  No results for input(s): \"A1C\" in the last 29504 hours.       No results for input(s): \"WBC\", \"HGB\", \"HCT\", \"MCV\", \"PLT\" in the last 61256 hours.  No results for input(s): \"HGB\" in the last 21596 hours. No results for input(s): \"TROPONINI\" in the last 30040 hours.  No results for input(s): \"BNP\", \"NTBNPI\", \"NTBNP\" in the last 87746 hours.  No results for input(s): \"TSH\" in the last 60954 hours.  No results for input(s): \"INR\" in the last 04658 hours.     Olivia Adames MD  Noninvasive Cardiologist   [facility]                                    "

## 2024-12-05 ENCOUNTER — OFFICE VISIT (OUTPATIENT)
Dept: CARDIOLOGY | Facility: CLINIC | Age: 49
End: 2024-12-05
Payer: COMMERCIAL

## 2024-12-05 ENCOUNTER — TELEPHONE (OUTPATIENT)
Dept: CARDIOLOGY | Facility: CLINIC | Age: 49
End: 2024-12-05

## 2024-12-05 VITALS
OXYGEN SATURATION: 97 % | HEART RATE: 77 BPM | HEIGHT: 67 IN | RESPIRATION RATE: 16 BRPM | DIASTOLIC BLOOD PRESSURE: 68 MMHG | SYSTOLIC BLOOD PRESSURE: 110 MMHG | WEIGHT: 145 LBS | BODY MASS INDEX: 22.76 KG/M2

## 2024-12-05 DIAGNOSIS — E78.1 HYPERTRIGLYCERIDEMIA: Primary | ICD-10-CM

## 2024-12-05 DIAGNOSIS — R10.13 ABDOMINAL PAIN, EPIGASTRIC: ICD-10-CM

## 2024-12-05 LAB
AMYLASE SERPL-CCNC: 32 U/L (ref 28–100)
LIPASE SERPL-CCNC: 40 U/L (ref 13–60)

## 2024-12-05 RX ORDER — ICOSAPENT ETHYL 1 G/1
1 CAPSULE ORAL 2 TIMES DAILY WITH MEALS
Qty: 60 CAPSULE | Refills: 11 | Status: SHIPPED | OUTPATIENT
Start: 2024-12-05

## 2024-12-05 RX ORDER — ROSUVASTATIN CALCIUM 10 MG/1
TABLET, COATED ORAL
COMMUNITY
Start: 2024-12-03

## 2024-12-05 NOTE — TELEPHONE ENCOUNTER
----- Message from Jesi CLAYTON sent at 12/5/2024  9:31 AM CST -----  Regarding: order needed for Dr Adames pt to rechk triglycerides in 2 weeks please  Dr Adames mentioned it on the the AVS, and I scheduled it for 12/20.    Thanks!  Flakita

## 2024-12-05 NOTE — TELEPHONE ENCOUNTER
This encounter is being sent to inform the clinic that this patient has a referral from Dr. Olivia Adames for the diagnoses of high triglycerides and has requested that this patient be seen within next available and/or with Dr. Med Park.  Based on the availability of our provider(s), we are unable to accommodate this request.    Were all sites offered this patient?  Yes    Does scheduling algorithm request to schedule next available?  Patient has been scheduled for the first available opening with Dr. Med Park on 02/06/25.  We have informed the patient that the clinic will review their referral and reach out if a sooner appointment is medically necessary.

## 2024-12-05 NOTE — TELEPHONE ENCOUNTER
"Confirmed per Dr. Adames's 12-5-24 visit note \"Recheck lipids 2 weeks to ensure trig level coming down\" - FLP order placed per protocol.  mg  "

## 2024-12-05 NOTE — LETTER
12/5/2024    Physician No Ref-Primary  No address on file    RE: Serina Block       Dear Colleague,     I had the pleasure of seeing Serina Block in the [facility].    HEART CARE OUT-PATIENT CONSULTATON NOTE      [facility]  [phone]      Assessment/Recommendations   Assessment: 49 year old female with hypertriglyceridemia     Plan:  Hypertriglyceridemia  Reviewed diagnosis, potential causes (elevated glucose, diet, EtOH), and the risk of pancreatitis       -Lifestyle modification with exercise, weight loss, avoid carbs and EtOH      -Stop Crestor 10mg, her LDL = 65 so does not need statin      -Vascepa 2g BID, if not covered can use Tricor       -Recheck lipids 2 weeks to ensure trig level coming down       -Referral to Dr Med Park, lipid clinic at the [facility]      -Advised cholesterol screening for siblings and children     2.  Abdominal pain  Could be GERD but wit her markedly elevated trig also worry about pancreatitis      -Check lipase and amylase     The longitudinal plan of care for the diagnosis(es)/condition(s) as documented were addressed during this visit. Due to the added complexity in care, I will continue to support Serina in the subsequent management and with ongoing continuity of care.            History of Present Illness/Subjective    Indication for Consult:  I was asked to see Serina Block by Bing Perez for hypertriglyceridemia       HPI: Serina Block is a 49 year old female with a history of hyperlipidemia who presents for evaluation of hypertriglyceridemia, trig 1490, 2479.    She reports no prior lipid checks, notes frequent abdominal pain, central, lasts an hour or two, can be triggered by fatty foods..  Admits not very active, but no exertional chest pain, dyspnea, orthopnea, or PND.  Doesn't eat red meat.  For breakfast has banana, yogurt, lunch, spaghetti, chicken, rice.       I reviewed notes from  "PCP prior to this visit.         Physical Examination  Past Cardiac History   Vitals: /68 (BP Location: Right arm, Patient Position: Sitting, Cuff Size: Adult Regular)   Pulse 77   Resp 16   Ht 1.702 m (5' 7\")   Wt 65.8 kg (145 lb)   SpO2 97%   BMI 22.71 kg/m    BMI= Body mass index is 22.71 kg/m .  Wt Readings from Last 3 Encounters:   12/05/24 65.8 kg (145 lb)   08/31/23 68 kg (150 lb)   01/16/23 66.1 kg (145 lb 12.8 oz)       General Appearance:   no distress, normal body habitus   ENT/Mouth: membranes moist, no oral lesions or bleeding gums.      EYES:  no scleral icterus, normal conjunctivae   Neck: no carotid bruits or thyromegaly   Chest/Lungs:   lungs are clear to auscultation, no rales or wheezing,  sternal scar, equal chest wall expansion    Cardiovascular:   Regular. Normal first and second heart sounds with no murmurs, rubs, or gallops; the carotid, radial and posterior tibial pulses are intact, Jugular venous pressure normal , no edema bilaterally    Abdomen:  no organomegaly, masses, bruits, or tenderness; bowel sounds are present   Extremities: no cyanosis or clubbing   Skin: no xanthelasma, warm.    Neurologic: normal  bilateral, no tremors           None    Most Recent Echocardiogram: 10/8/2018 ([facility])  Normal resting left ventricular function (EF 60%) with normal expected increase with   exercise (EF 70%).  No inducible wall motion    abnormalities.     Most Recent Stress Test: 0/8/2018 ([facility])  1. Normal stress echocardiogram without evidence of stress-induced ischemia.    2. No chest pain or EKG abnormalities with exercise.    3. Normal resting left ventricular function (EF 60%) with normal expected increase with   exercise (EF 70%).  No inducible wall motion    abnormalities.    4. Good exercise tolerance.  Normal hemodynamic response to exercise.     Most Recent Angiogram: None           Medical History  Family History Social History   Hyperlipidemia   Parents have " hyperlipidemia on statin      Social History     Socioeconomic History     Marital status: Single     Spouse name: Not on file     Number of children: Not on file     Years of education: Not on file     Highest education level: Not on file   Occupational History     Not on file   Tobacco Use     Smoking status: Never     Smokeless tobacco: Never   Vaping Use     Vaping status: Never Used   Substance and Sexual Activity     Alcohol use: Not on file     Drug use: Not on file     Sexual activity: Not on file   Other Topics Concern     Not on file   Social History Narrative     Not on file     Social Drivers of Health     Financial Resource Strain: Low Risk  (2/27/2023)    [facility]    Financial Resource Strain      Difficulty of Paying Living Expenses: 3      Difficulty of Paying Living Expenses: Not on file   Food Insecurity: Not on File (9/26/2024)    Received from "[employer]"    Food Insecurity      Food: 0   Transportation Needs: No Transportation Needs (2/27/2023)    [facility] [facility], [facility]    Transportation Needs      Lack of Transportation (Medical): 1   Physical Activity: Not on File (12/4/2021)    Received from "[employer]"    Physical Activity      Physical Activity: 0   Stress: Not on File (12/4/2021)    Received from "[employer]"    Stress      Stress: 0   Social Connections: Not on File (9/12/2024)    Received from "[employer]"    Social Connections      Connectedness: 0   Interpersonal Safety: Not on file   Housing Stability: Low Risk  (2/27/2023)    [facility] [employer] [facility], [facility]    Housing Stability      Unable to Pay for Housing in the Last Year: 1           Medications  Allergies   Current Outpatient Medications   Medication Sig Dispense Refill     rosuvastatin (CRESTOR) 10 MG  "tablet Take 5 mg (1/2 tab) for two weeks then increase to 10 mg (1 tab) for two weeks and then increase to 20 mg (2 tabs) daily going forward       albuterol (PROAIR HFA/PROVENTIL HFA/VENTOLIN HFA) 108 (90 Base) MCG/ACT inhaler Inhale 1-2 puffs into the lungs (Patient not taking: Reported on 12/5/2024)       ALPRAZolam (XANAX) 0.25 MG tablet Take 0.25 mg by mouth (Patient not taking: Reported on 12/5/2024)       azithromycin (ZITHROMAX) 250 MG tablet Take 2 tablets (500 mg) by mouth daily Take after the third loose stool (Patient not taking: Reported on 12/5/2024) 6 tablet 0     azithromycin (ZITHROMAX) 250 MG tablet Two tablets first day, then one tablet daily for four days. (Patient not taking: Reported on 12/5/2024) 6 tablet 0     Ibuprofen (ADVIL PO) PRN (Patient not taking: Reported on 12/5/2024)       predniSONE (DELTASONE) 20 MG tablet  (Patient not taking: Reported on 12/5/2024)       No Known Allergies       Lab Results    Chemistry/lipid CBC Cardiac Enzymes/BNP/TSH/INR   No results for input(s): \"CHOL\", \"HDL\", \"LDL\", \"TRIG\", \"CHOLHDLRATIO\" in the last 23633 hours.  No results for input(s): \"LDL\" in the last 51052 hours.  No results for input(s): \"NA\", \"POTASSIUM\", \"CHLORIDE\", \"CO2\", \"GLC\", \"BUN\", \"CR\", \"GFRESTIMATED\", \"ALEJANDRA\" in the last 80544 hours.    Invalid input(s): \"GRFESTBLACK\"  No results for input(s): \"CR\" in the last 79193 hours.  No results for input(s): \"A1C\" in the last 90816 hours.       No results for input(s): \"WBC\", \"HGB\", \"HCT\", \"MCV\", \"PLT\" in the last 71660 hours.  No results for input(s): \"HGB\" in the last 08112 hours. No results for input(s): \"TROPONINI\" in the last 83850 hours.  No results for input(s): \"BNP\", \"NTBNPI\", \"NTBNP\" in the last 06235 hours.  No results for input(s): \"TSH\" in the last 31978 hours.  No results for input(s): \"INR\" in the last 44531 hours.     Olivia Adames MD  Noninvasive Cardiologist   [facility]                                  "       Thank you for allowing me to participate in the care of your patient.      Sincerely,     Olivia Adames MD     [facility]  cc:   Bing Bowman PA-C  [address] [address]

## 2024-12-05 NOTE — PATIENT INSTRUCTIONS
Your triglycerides are very high, most likely this is genetic.  Triglycerides can also increase with sugar, carbohydrate, and alcohol intake.  I would avoid all of these.    With your abdominal pain I do want to check for pancreatitis with some blood tests (which is inflammation in the pancreas).  With your abdominal pain will get a blood test today to look for that.    Stop the Rosuvastatin, this targets LDL cholesterol, which in your case is normal at 65.  It is the LDL that increases risk of heart disease.      You need a medication that specifically targets triglycerides. There are several but the most effective one is called Vascepa 1 gram twice daily with meals.  If this one is prohibitively expensive when you go to pick it up let Dr Adames know and we can use one called Tricor instead.      Recheck triglycerides levels in 2 weeks to make sure they are coming down.    I will also place a referral to the lipid specialist at the [facility].  I am not sure if there is specific genetic testing but if there is the lipid clinic will be able to do that.  I would definitely advise cholesterol checks for your sib,lings and children.  Your nieces and nephews should get checked if their respective parent has high cholesterol

## 2024-12-05 NOTE — TELEPHONE ENCOUNTER
----- Message from Olivia Adames sent at 12/5/2024  9:07 AM CST -----  I would like this patient to see Dr Med Park who is a lipid specialist at the [facility].  I placed a  referral because I didn't know what order to use, but I just want to make sure she doesn't just get scheduled back with me or another one of the cardiologists here.  She specifically needs to see Dr Park     Thanks,  Olivia

## 2024-12-20 ENCOUNTER — LAB (OUTPATIENT)
Dept: CARDIOLOGY | Facility: CLINIC | Age: 49
End: 2024-12-20
Payer: COMMERCIAL

## 2024-12-20 DIAGNOSIS — E78.1 HYPERTRIGLYCERIDEMIA: ICD-10-CM

## 2024-12-20 LAB
CHOLEST SERPL-MCNC: 186 MG/DL
FASTING STATUS PATIENT QL REPORTED: YES
HDLC SERPL-MCNC: 31 MG/DL
LDLC SERPL CALC-MCNC: ABNORMAL MG/DL
LDLC SERPL DIRECT ASSAY-MCNC: 68 MG/DL
NONHDLC SERPL-MCNC: 155 MG/DL
TRIGL SERPL-MCNC: 407 MG/DL

## 2024-12-20 PROCEDURE — 80061 LIPID PANEL: CPT

## 2024-12-20 PROCEDURE — 36415 COLL VENOUS BLD VENIPUNCTURE: CPT

## 2025-01-08 DIAGNOSIS — E78.1 HYPERTRIGLYCERIDEMIA: Primary | ICD-10-CM

## 2025-01-27 ENCOUNTER — TELEPHONE (OUTPATIENT)
Dept: CARDIOLOGY | Facility: CLINIC | Age: 50
End: 2025-01-27
Payer: COMMERCIAL

## 2025-01-27 DIAGNOSIS — E78.1 HYPERTRIGLYCERIDEMIA: Primary | ICD-10-CM

## 2025-01-27 NOTE — TELEPHONE ENCOUNTER
Left Voicemail (1st Attempt) and Sent Mychart (1st Attempt) for the patient to call back and schedule the following:    Appointment type: FASTING LABS  Provider: DOUG  Return date: 2/6/2025  Specialty phone number: [phone]  Additional appointment(s) needed: N/A  Additonal Notes: N/A

## 2025-01-29 ENCOUNTER — TELEPHONE (OUTPATIENT)
Dept: CARDIOLOGY | Facility: CLINIC | Age: 50
End: 2025-01-29
Payer: COMMERCIAL

## 2025-01-29 NOTE — TELEPHONE ENCOUNTER
Left Voicemail (2nd Attempt) and Sent Mychart (2nd Attempt) for the patient to call back and schedule the following:    Appointment type: FASTING LABS  Provider: DOUG  Return date: 2/6/2025  Specialty phone number: [phone]  Additional appointment(s) needed: N/A  Additonal Notes: N/A

## 2025-02-03 ENCOUNTER — LAB (OUTPATIENT)
Dept: LAB | Facility: CLINIC | Age: 50
End: 2025-02-03
Payer: COMMERCIAL

## 2025-02-03 DIAGNOSIS — E78.1 HYPERTRIGLYCERIDEMIA: ICD-10-CM

## 2025-02-03 LAB
ALBUMIN SERPL BCG-MCNC: 4.2 G/DL (ref 3.5–5.2)
ALP SERPL-CCNC: 62 U/L (ref 40–150)
ALT SERPL W P-5'-P-CCNC: 17 U/L (ref 0–50)
ANION GAP SERPL CALCULATED.3IONS-SCNC: 13 MMOL/L (ref 7–15)
APO A-I SERPL-MCNC: <6 MG/DL
AST SERPL W P-5'-P-CCNC: 23 U/L (ref 0–45)
BILIRUB SERPL-MCNC: 0.5 MG/DL
BUN SERPL-MCNC: 12.4 MG/DL (ref 6–20)
CALCIUM SERPL-MCNC: 9 MG/DL (ref 8.8–10.4)
CHLORIDE SERPL-SCNC: 105 MMOL/L (ref 98–107)
CHOLEST SERPL-MCNC: 228 MG/DL
CREAT SERPL-MCNC: 0.69 MG/DL (ref 0.51–0.95)
EGFRCR SERPLBLD CKD-EPI 2021: >90 ML/MIN/1.73M2
FASTING STATUS PATIENT QL REPORTED: YES
FASTING STATUS PATIENT QL REPORTED: YES
GLUCOSE SERPL-MCNC: 87 MG/DL (ref 70–99)
HCO3 SERPL-SCNC: 19 MMOL/L (ref 22–29)
HDLC SERPL-MCNC: 28 MG/DL
LDLC SERPL CALC-MCNC: ABNORMAL MG/DL
LDLC SERPL DIRECT ASSAY-MCNC: 35 MG/DL
NONHDLC SERPL-MCNC: 200 MG/DL
POTASSIUM SERPL-SCNC: 4.7 MMOL/L (ref 3.4–5.3)
PROT SERPL-MCNC: 7.3 G/DL (ref 6.4–8.3)
SODIUM SERPL-SCNC: 137 MMOL/L (ref 135–145)
TRIGL SERPL-MCNC: 711 MG/DL

## 2025-02-03 PROCEDURE — 36415 COLL VENOUS BLD VENIPUNCTURE: CPT

## 2025-02-03 PROCEDURE — 83721 ASSAY OF BLOOD LIPOPROTEIN: CPT

## 2025-02-03 PROCEDURE — 83695 ASSAY OF LIPOPROTEIN(A): CPT

## 2025-02-03 PROCEDURE — 80053 COMPREHEN METABOLIC PANEL: CPT

## 2025-02-03 PROCEDURE — 80061 LIPID PANEL: CPT | Mod: 59

## 2025-02-06 ENCOUNTER — OFFICE VISIT (OUTPATIENT)
Dept: CARDIOLOGY | Facility: CLINIC | Age: 50
End: 2025-02-06
Attending: INTERNAL MEDICINE
Payer: COMMERCIAL

## 2025-02-06 VITALS
DIASTOLIC BLOOD PRESSURE: 74 MMHG | SYSTOLIC BLOOD PRESSURE: 109 MMHG | HEART RATE: 96 BPM | WEIGHT: 142 LBS | OXYGEN SATURATION: 96 % | BODY MASS INDEX: 22.24 KG/M2

## 2025-02-06 DIAGNOSIS — E78.1 HYPERTRIGLYCERIDEMIA: ICD-10-CM

## 2025-02-06 PROCEDURE — 99213 OFFICE O/P EST LOW 20 MIN: CPT | Performed by: INTERNAL MEDICINE

## 2025-02-06 PROCEDURE — 93005 ELECTROCARDIOGRAM TRACING: CPT

## 2025-02-06 PROCEDURE — 99214 OFFICE O/P EST MOD 30 MIN: CPT | Performed by: INTERNAL MEDICINE

## 2025-02-06 RX ORDER — OMEGA-3-ACID ETHYL ESTERS 1 G/1
2 CAPSULE, LIQUID FILLED ORAL 2 TIMES DAILY
Qty: 360 CAPSULE | Refills: 3 | Status: SHIPPED | OUTPATIENT
Start: 2025-02-06

## 2025-02-06 RX ORDER — FENOFIBRATE 145 MG/1
145 TABLET, COATED ORAL DAILY
Qty: 90 TABLET | Refills: 3 | Status: SHIPPED | OUTPATIENT
Start: 2025-02-06

## 2025-02-06 ASSESSMENT — PAIN SCALES - GENERAL: PAINLEVEL_OUTOF10: NO PAIN (0)

## 2025-02-06 NOTE — PATIENT INSTRUCTIONS
February 6, 2025    Cardiology Provider You Saw During Your Visit: Dr. Park      Medication Changes:   -Start Fenofibrate 145 mg daily  -Start Lovaza 2 g twice daily      Follow Up:   -Abdominal ultrasound when able  -Fasting labs in 3 months  -Follow up in clinic in 1 year with fasting labs just prior to visit      Labs:    Latest Reference Range & Units 02/03/25 07:43   Sodium 135 - 145 mmol/L 137   Potassium 3.4 - 5.3 mmol/L 4.7   Chloride 98 - 107 mmol/L 105   Carbon Dioxide (CO2) 22 - 29 mmol/L 19 (L)   Urea Nitrogen 6.0 - 20.0 mg/dL 12.4   Creatinine 0.51 - 0.95 mg/dL 0.69   GFR Estimate >60 mL/min/1.73m2 >90   Calcium 8.8 - 10.4 mg/dL 9.0   Anion Gap 7 - 15 mmol/L 13   Albumin 3.5 - 5.2 g/dL 4.2   Protein Total 6.4 - 8.3 g/dL 7.3   Alkaline Phosphatase 40 - 150 U/L 62   ALT 0 - 50 U/L 17   AST 0 - 45 U/L 23   Bilirubin Total <=1.2 mg/dL 0.5   Cholesterol <200 mg/dL 228 (H)   Patient Fasting?  Yes  Yes   Glucose 70 - 99 mg/dL 87   HDL Cholesterol >=50 mg/dL 28 (L)   LDL Cholesterol Calculated  See Comment   LDL Cholesterol Direct <100 mg/dL 35   Lipoprotein (a) <30 mg/dL <6   Non HDL Cholesterol <130 mg/dL 200 (H)   Triglycerides <150 mg/dL 711 (H)   (L): Data is abnormally low  (H): Data is abnormally high        Follow the American Heart Association Diet and Lifestyle Recommendations:  -Limit saturated fat, trans fat, sodium, red meat, sweets and sugar-sweetened beverages. If you choose to eat red meat, compare labels and select the leanest cuts available.  -Aim for at least 150 minutes of moderate physical activity or 75 minutes of vigorous physical activity - or an equal combination of both - each week.      To Reach Us:  -During business hours: [phone], press option # 1 to schedule an appointment or to leave a message for your care team.     -After hours, weekends or holidays: [phone], press option #4 and ask to speak to the on-call cardiologist. Inform them you are a patient at the  [place].        **If you have a cardiac device, please make sure you schedule an in-person device check just prior to your cardiology provider appointments**        Liliam Pinon RN  Cardiology Care Coordinator - General Cardiology  [facility]

## 2025-02-06 NOTE — NURSING NOTE
Chief Complaint   Patient presents with    New Patient     NEW LIPID MANAGEMENT     Vitals were taken, medications reconciled, and EKG was performed.    Memo Frazier, EMT  1:08 PM    
Yes

## 2025-02-06 NOTE — PROGRESS NOTES
HPI: I had the privilige to evaluate Mrs Serina Block, who is a 49 year ld female with mixed dyslipidemia and severe hypertriglyceridemia.    Patient denies chest pain, shortness of breath, palpitations.    PAST MEDICAL HISTORY:    Mild intermittent asthma without complication 2022     Abnormal breast exam 2015     Pectus excavatum 1978             CURRENT MEDICATIONS:  Current Outpatient Medications   Medication Sig Dispense Refill    albuterol (PROAIR HFA/PROVENTIL HFA/VENTOLIN HFA) 108 (90 Base) MCG/ACT inhaler Inhale 1-2 puffs into the lungs (Patient not taking: Reported on 2025)      ALPRAZolam (XANAX) 0.25 MG tablet Take 0.25 mg by mouth (Patient not taking: Reported on 2025)      azithromycin (ZITHROMAX) 250 MG tablet Take 2 tablets (500 mg) by mouth daily Take after the third loose stool (Patient not taking: Reported on 2023) 6 tablet 0    azithromycin (ZITHROMAX) 250 MG tablet Two tablets first day, then one tablet daily for four days. (Patient not taking: Reported on 2023) 6 tablet 0    Ibuprofen (ADVIL PO) PRN (Patient not taking: Reported on 2025)      icosapent ethyl (VASCEPA) 1 g CAPS capsule Take 1 g by mouth 2 times daily (with meals). (Patient not taking: Reported on 2025) 60 capsule 11    predniSONE (DELTASONE) 20 MG tablet  (Patient not taking: Reported on 2021)      rosuvastatin (CRESTOR) 10 MG tablet Take 5 mg (1/2 tab) for two weeks then increase to 10 mg (1 tab) for two weeks and then increase to 20 mg (2 tabs) daily going forward (Patient not taking: Reported on 2025)         PAST SURGICAL HISTORY:     [id number].     failure to progress.   WISDOM TEETH EXTRACTION late s, early        ALLERGIES   No Known Allergies    FAMILY HISTORY:  No family history on file.    SOCIAL HISTORY:  Social History     Socioeconomic History    Marital status: Single     Spouse name: None    Number of children: None    Years of  education: None    Highest education level: None   Tobacco Use    Smoking status: Never- exposed to 2nd hand smoke for 18 years        Smokeless tobacco: Never   Vaping Use    Vaping status: Never Used     Social Drivers of Health     Financial Resource Strain: Low Risk  (2/27/2023)    [facility], [facility]    Financial Resource Strain     Difficulty of Paying Living Expenses: 3   Food Insecurity: Not on File (9/26/2024)    Received from [employer]    Food Insecurity     Food: 0   Transportation Needs: No Transportation Needs (2/27/2023)    [facility], [facility]    Transportation Needs     Lack of Transportation (Medical): 1   Physical Activity: Not on File (12/4/2021)    Received from [employer]    Physical Activity     Physical Activity: 0   Stress: Not on File (12/4/2021)    Received from [employer]    Stress     Stress: 0   Social Connections: Not on File (9/12/2024)    Received from [employer]    Social Connections     Connectedness: 0   Housing Stability: Low Risk  (2/27/2023)    [facility], [facility]    Housing Stability     Unable to Pay for Housing in the Last Year: 1       ROS:   Constitutional: No fever, chills, or sweats. No weight gain/loss   ENT: No visual disturbance, ear ache, epistaxis, sore throat  Allergies/Immunologic: Negative.   Respiratory: No cough, hemoptysia  Cardiovascular: As per HPI  GI: No nausea, vomiting, hematemesis, melena, or hematochezia  : No urinary frequency, dysuria, or hematuria  Integument: Negative  Psychiatric: Negative  Neuro: Negative  Endocrinology: Negative   Musculoskeletal: Negative    EXAM:  /74 (BP Location: Left arm, Patient Position: Chair, Cuff Size: Adult Regular)   Pulse 96   Wt 64.4 kg (142 lb)   SpO2 96%   BMI 22.24 kg/m     In general, the patient is a pleasant female in no apparent distress.    HEENT: NC/AT.  PERRLA.  EOMI.  Sclerae white, not injected.  Nares clear.  Pharynx without erythema or exudate.  Dentition intact.    Neck: No adenopathy.  No thyromegaly. Carotids +4/4 bilaterally without bruits.  No jugular venous distension.   Heart: RRR. Normal S1, S2 splits physiologically. No murmur, rub, click, or gallop. The PMI is in the 5th ICS in the midclavicular line. There is no heave.    Lungs: CTA.  No ronchi, wheezes, rales.  No dullness to percussion.   Abdomen: Soft, nontender, nondistended. No organomegaly.  No bruits.   Extremities: No clubbing, cyanosis, or edema.  The pulses are +4/4 at the radial, brachial, femoral, popliteal, DP, and PT sites bilaterally.  No bruits are noted.  Neurologic: Alert and oriented to person/place/time, normal speech, gait and affect  Skin: No petechiae, purpura or rash.    Labs:  LIPID RESULTS:  Lab Results   Component Value Date    CHOL 228 (H) 02/03/2025    HDL 28 (L) 02/03/2025    LDL 35 02/03/2025    LDL  02/03/2025      Comment:      Cannot estimate LDL when triglyceride exceeds 400 mg/dL    TRIG 711 (H) 02/03/2025    NHDL 200 (H) 02/03/2025     Lpa 6 mg/dl (normal < 30 mg/dl)    LIVER ENZYME RESULTS:  Lab Results   Component Value Date    AST 23 02/03/2025    ALT 17 02/03/2025       BMP RESULTS:  Lab Results   Component Value Date     02/03/2025    POTASSIUM 4.7 02/03/2025    CHLORIDE 105 02/03/2025    CO2 19 (L) 02/03/2025    ANIONGAP 13 02/03/2025    GLC 87 02/03/2025    BUN 12.4 02/03/2025    CR 0.69 02/03/2025    GFRESTIMATED >90 02/03/2025    ALEJANDRA 9.0 02/03/2025          Procedures:      EKG: sin rhythm, inverted T in DIII and flat T wave in aVF    Assessment and Plan:     We discussed the results with patient.  We discussed the importance of a heart healthy diet and lifestyle.  We discussed following therapy:    Medication Changes:   -Start Fenofibrate 145 mg daily  -Start  Lovaza 2 g twice daily        Follow Up:   -Abdominal ultrasound when able  -Fasting labs in 3 months  -Follow up in clinic in 1 year with fasting labs just prior to visit  .    Med Park MD, PhD  Professor of Medicine  Division of Cardiology     CC  Patient Care Team:  No Ref-Primary, Physician as PCP - General  Clinic - [facility] as Assigned PCP  Med Park MD as MD (Cardiovascular Disease)  Olivia Sandhu MD as MD (Interventional Cardiology)  Olivia Sandhu MD as Assigned Heart and Vascular Provider  Liliam Pinon, RN as Specialty Care Coordinator (Cardiology)  OLIVIA SANDHU

## 2025-02-06 NOTE — Clinical Note
2/6/2025      RE: Serina Block  [address] [place] [address]       Dear Colleague,    Thank you for the opportunity to participate in the care of your patient, Serina Block, at the [facility] [place] [facility]. Please see a copy of my visit note below.    No notes on file    Please do not hesitate to contact me if you have any questions/concerns.     Sincerely,     Med Park MD

## 2025-02-12 LAB
ATRIAL RATE - MUSE: 83 BPM
DIASTOLIC BLOOD PRESSURE - MUSE: NORMAL MMHG
INTERPRETATION ECG - MUSE: NORMAL
P AXIS - MUSE: 61 DEGREES
PR INTERVAL - MUSE: 118 MS
QRS DURATION - MUSE: 72 MS
QT - MUSE: 348 MS
QTC - MUSE: 408 MS
R AXIS - MUSE: 55 DEGREES
SYSTOLIC BLOOD PRESSURE - MUSE: NORMAL MMHG
T AXIS - MUSE: 19 DEGREES
VENTRICULAR RATE- MUSE: 83 BPM

## 2025-02-23 NOTE — CONFIDENTIAL NOTE
HPI: I had the privilige to evaluate Mrs Serina Block, who is a 49 year ld female with mixed dyslipidemia and severe hypertriglyceridemia.     Patient denies chest pain, shortness of breath, palpitations.     PAST MEDICAL HISTORY:     Mild intermittent asthma without complication 2022     Abnormal breast exam 2015     Pectus excavatum 1978                 CURRENT MEDICATIONS:  Current Outpatient Prescriptions[]Expand by Default          Current Outpatient Medications   Medication Sig Dispense Refill    albuterol (PROAIR HFA/PROVENTIL HFA/VENTOLIN HFA) 108 (90 Base) MCG/ACT inhaler Inhale 1-2 puffs into the lungs (Patient not taking: Reported on 2025)        ALPRAZolam (XANAX) 0.25 MG tablet Take 0.25 mg by mouth (Patient not taking: Reported on 2025)        azithromycin (ZITHROMAX) 250 MG tablet Take 2 tablets (500 mg) by mouth daily Take after the third loose stool (Patient not taking: Reported on 2023) 6 tablet 0    azithromycin (ZITHROMAX) 250 MG tablet Two tablets first day, then one tablet daily for four days. (Patient not taking: Reported on 2023) 6 tablet 0    Ibuprofen (ADVIL PO) PRN (Patient not taking: Reported on 2025)        icosapent ethyl (VASCEPA) 1 g CAPS capsule Take 1 g by mouth 2 times daily (with meals). (Patient not taking: Reported on 2025) 60 capsule 11    predniSONE (DELTASONE) 20 MG tablet  (Patient not taking: Reported on 2021)        rosuvastatin (CRESTOR) 10 MG tablet Take 5 mg (1/2 tab) for two weeks then increase to 10 mg (1 tab) for two weeks and then increase to 20 mg (2 tabs) daily going forward (Patient not taking: Reported on 2025)                PAST SURGICAL HISTORY:      [id number].     failure to progress.   WISDOM TEETH EXTRACTION late s, early          ALLERGIES     Allergies   No Known Allergies        FAMILY HISTORY:  Family History   No family history on file.        SOCIAL  HISTORY:  Social History            Socioeconomic History    Marital status: Single       Spouse name: None    Number of children: None    Years of education: None    Highest education level: None   Tobacco Use    Smoking status: Never- exposed to 2nd hand smoke for 18 years         Smokeless tobacco: Never   Vaping Use    Vaping status: Never Used      Social Drivers of Health           Financial Resource Strain: Low Risk  (2/27/2023)     Received from [facility], [facility]     Financial Resource Strain      Difficulty of Paying Living Expenses: 3   Food Insecurity: Not on File (9/26/2024)     Received from [employer]     Food Insecurity      Food: 0   Transportation Needs: No Transportation Needs (2/27/2023)     Received from [facility], [facility]     Transportation Needs      Lack of Transportation (Medical): 1   Physical Activity: Not on File (12/4/2021)     Received from [employer]     Physical Activity      Physical Activity: 0   Stress: Not on File (12/4/2021)     Received from [facility]     Stress      Stress: 0   Social Connections: Not on File (9/12/2024)     Received from [employer]     Social Connections      Connectedness: 0   Housing Stability: Low Risk  (2/27/2023)     Received from [facility], [facility]     Housing Stability      Unable to Pay for Housing in the Last Year: 1         ROS:   Constitutional: No fever, chills, or sweats. No weight gain/loss   ENT: No visual disturbance, ear ache, epistaxis, sore throat  Allergies/Immunologic: Negative.   Respiratory: No cough, hemoptysia  Cardiovascular: As per HPI  GI: No nausea, vomiting, hematemesis, melena, or hematochezia  : No urinary frequency, dysuria, or hematuria  Integument: Negative  Psychiatric: Negative  Neuro: Negative  Endocrinology: Negative    Musculoskeletal: Negative     EXAM:  /74 (BP Location: Left arm, Patient Position: Chair, Cuff Size: Adult Regular)   Pulse 96   Wt 64.4 kg (142 lb)   SpO2 96%   BMI 22.24 kg/m    In general, the patient is a pleasant female in no apparent distress.    HEENT: NC/AT.  PERRLA.  EOMI.  Sclerae white, not injected.  Nares clear.  Pharynx without erythema or exudate.  Dentition intact.    Neck: No adenopathy.  No thyromegaly. Carotids +4/4 bilaterally without bruits.  No jugular venous distension.   Heart: RRR. Normal S1, S2 splits physiologically. No murmur, rub, click, or gallop. The PMI is in the 5th ICS in the midclavicular line. There is no heave.    Lungs: CTA.  No ronchi, wheezes, rales.  No dullness to percussion.   Abdomen: Soft, nontender, nondistended. No organomegaly.  No bruits.   Extremities: No clubbing, cyanosis, or edema.  The pulses are +4/4 at the radial, brachial, femoral, popliteal, DP, and PT sites bilaterally.  No bruits are noted.  Neurologic: Alert and oriented to person/place/time, normal speech, gait and affect  Skin: No petechiae, purpura or rash.     Labs:  LIPID RESULTS:          Lab Results   Component Value Date     CHOL 228 (H) 02/03/2025     HDL 28 (L) 02/03/2025     LDL 35 02/03/2025     LDL   02/03/2025         Comment:         Cannot estimate LDL when triglyceride exceeds 400 mg/dL     TRIG 711 (H) 02/03/2025     NHDL 200 (H) 02/03/2025      Lpa 6 mg/dl (normal < 30 mg/dl)     LIVER ENZYME RESULTS:        Lab Results   Component Value Date     AST 23 02/03/2025     ALT 17 02/03/2025         BMP RESULTS:        Lab Results   Component Value Date      02/03/2025     POTASSIUM 4.7 02/03/2025     CHLORIDE 105 02/03/2025     CO2 19 (L) 02/03/2025     ANIONGAP 13 02/03/2025     GLC 87 02/03/2025     BUN 12.4 02/03/2025     CR 0.69 02/03/2025     GFRESTIMATED >90 02/03/2025     ALEJANDRA 9.0 02/03/2025            Procedures:        EKG: sin rhythm, inverted T in DIII and flat T  wave in aVF     Assessment and Plan:      We discussed the results with patient.  We discussed the importance of a heart healthy diet and lifestyle.  We discussed following therapy:     Medication Changes:   -Start Fenofibrate 145 mg daily  -Start Lovaza 2 g twice daily        Follow Up:   -Abdominal ultrasound when able  -Fasting labs in 3 months  -Follow up in clinic in 1 year with fasting labs just prior to visit  .     Med Park MD, PhD  Professor of Medicine  Division of Cardiology      CC  Patient Care Team:  No Ref-Primary, Physician as PCP - General  Clinic - [facility] as Assigned PCP  Med Park MD as MD (Cardiovascular Disease)  Olivia Sandhu MD as MD (Interventional Cardiology)  Olivia Sandhu MD as Assigned Heart and Vascular Provider  Liliam Pinon RN as Specialty Care Coordinator (Cardiology)  OLIVIA SANDHU

## 2025-03-05 ENCOUNTER — MYC REFILL (OUTPATIENT)
Dept: CARDIOLOGY | Facility: CLINIC | Age: 50
End: 2025-03-05
Payer: COMMERCIAL

## 2025-03-05 DIAGNOSIS — E78.1 HYPERTRIGLYCERIDEMIA: ICD-10-CM

## 2025-03-06 RX ORDER — OMEGA-3-ACID ETHYL ESTERS 1 G/1
2 CAPSULE, LIQUID FILLED ORAL 2 TIMES DAILY
Qty: 360 CAPSULE | Refills: 3 | OUTPATIENT
Start: 2025-03-06

## 2025-03-06 RX ORDER — FENOFIBRATE 145 MG/1
145 TABLET, COATED ORAL DAILY
Qty: 90 TABLET | Refills: 3 | OUTPATIENT
Start: 2025-03-06

## 2025-03-10 ENCOUNTER — HOSPITAL ENCOUNTER (OUTPATIENT)
Dept: ULTRASOUND IMAGING | Facility: HOSPITAL | Age: 50
Discharge: HOME | End: 2025-03-10
Attending: INTERNAL MEDICINE | Admitting: INTERNAL MEDICINE
Payer: COMMERCIAL

## 2025-03-10 DIAGNOSIS — E78.1 HYPERTRIGLYCERIDEMIA: ICD-10-CM

## 2025-03-10 PROCEDURE — 76705 ECHO EXAM OF ABDOMEN: CPT

## 2025-03-27 ENCOUNTER — ALLIED HEALTH/NURSE VISIT (OUTPATIENT)
Dept: RESEARCH | Facility: CLINIC | Age: 50
End: 2025-03-27
Payer: COMMERCIAL

## 2025-03-27 DIAGNOSIS — Z00.6 RESEARCH STUDY PATIENT: Primary | ICD-10-CM

## 2025-03-27 NOTE — NURSING NOTE
DEE-3    DOUBLE-BLIND, PLACEBO-CONTROLLED, PHASE 3 STUDY TO EVALUATE THE EFFICACY AND SAFETY OF PLOZASIRAN IN ADULTS WITH SEVERE HYPERTRIGLYCERIDEMIA (DEE-3 STUDY)       IRB: [id number]  PI: Med Park MD, PhD, [facility]- Phone: [phone]  Research Study Coordinators: Franca Dietz, CRC - Phone: [phone], Yesika Killian, CRC - Phone: [phone]     Patient was approached for possible participation for the above study. Inclusion and exclusion criteria reviewed. Patient meets all of the inclusion criteria and does not meet any of the exclusion criteria. The current approved IRB consent form was discussed and explained to the patient.  It was discussed that involvement with the study is voluntary and refusal to participate would not involve penalty or decrease benefits at which the patient is entitled, and the subject may discontinue involvement at any time without penalty or loss in benefits. The consent form was reviewed including purpose, research hypothesis, nature of the research, risk & benefits. Also reviewed were confidentiality issues, compensation for injury, and alternative procedures available. The patient was given time to review and ask any questions about the consent. Patient was shown contact information for PI and study staff in consent for future questions. Patient verbalized understanding of consent and study by restating the purpose, procedures, duration, risk, confidentiality of PHI, and voluntarily participation. Questions and concerns were addressed. Patient printed, signed and dated the consent/HIPAA form prior to study involvement.     The following consent forms were review and signed at this visit. A copy was given to the patient for their records.   Study informed consent  Version Date: 12.02.2024    Patient did not consent to the Optional Study Procedures (blood for future research, blood for genetic testing, MRI)      Subject Consent/HIPAA: SIGNED ON 27Mar2025      Franca J Luis

## 2025-04-19 ENCOUNTER — HEALTH MAINTENANCE LETTER (OUTPATIENT)
Age: 50
End: 2025-04-19